# Patient Record
Sex: MALE | Race: ASIAN | URBAN - METROPOLITAN AREA
[De-identification: names, ages, dates, MRNs, and addresses within clinical notes are randomized per-mention and may not be internally consistent; named-entity substitution may affect disease eponyms.]

---

## 2023-02-28 ENCOUNTER — OFFICE VISIT (OUTPATIENT)
Dept: URBAN - METROPOLITAN AREA CLINIC 91 | Facility: CLINIC | Age: 78
End: 2023-02-28
Payer: MEDICARE

## 2023-02-28 DIAGNOSIS — E11.3293 TYPE 2 DIAB WITH MILD NONP RTNOP WITHOUT MACULAR EDEMA, BI: Primary | ICD-10-CM

## 2023-02-28 DIAGNOSIS — H25.13 AGE-RELATED NUCLEAR CATARACT, BILATERAL: ICD-10-CM

## 2023-02-28 DIAGNOSIS — H02.831 DERMATOCHALASIS OF RIGHT UPPER EYELID: ICD-10-CM

## 2023-02-28 DIAGNOSIS — H43.812 VITREOUS DEGENERATION, LEFT EYE: ICD-10-CM

## 2023-02-28 DIAGNOSIS — H02.834 DERMATOCHALASIS OF LEFT UPPER EYELID: ICD-10-CM

## 2023-02-28 PROCEDURE — 92134 CPTRZ OPH DX IMG PST SGM RTA: CPT | Performed by: OPHTHALMOLOGY

## 2023-02-28 PROCEDURE — 99204 OFFICE O/P NEW MOD 45 MIN: CPT | Performed by: OPHTHALMOLOGY

## 2023-02-28 ASSESSMENT — VISUAL ACUITY: OD: 20/40

## 2023-02-28 ASSESSMENT — INTRAOCULAR PRESSURE
OD: 15
OS: 13

## 2023-02-28 NOTE — IMPRESSION/PLAN
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 Plan: I have explained to patient that there is evidence of mild diabetic retinopathy in both eyes. I have stressed the importance of patient maintaining good blood sugar control, and patient understands the need for close follow-up. Patient will return for regular follow-up appointments with repeat dilation. OCTm obtained and reviewed with patient.

## 2023-02-28 NOTE — IMPRESSION/PLAN
Impression: Vitreous degeneration, left eye: H43.181. Plan: For posterior vitreous detachment, I have explained signs and symptoms associated with retinal tear or detachment. Patient was advised to return ASAP if they notice an increase or change in floaters, change or decrease in vision or a veil or curtain coming down over their vision. I have explained the risk of vision loss, and possible need for retinal detachment repair in detail. Patient was also provided with an informational pamphlet.

## 2023-04-05 ENCOUNTER — OFFICE VISIT (OUTPATIENT)
Dept: URBAN - METROPOLITAN AREA CLINIC 91 | Facility: CLINIC | Age: 78
End: 2023-04-05
Payer: MEDICARE

## 2023-04-05 DIAGNOSIS — H43.813 VITREOUS DEGENERATION, BILATERAL: Primary | ICD-10-CM

## 2023-04-05 DIAGNOSIS — H02.834 DERMATOCHALASIS OF LEFT UPPER EYELID: ICD-10-CM

## 2023-04-05 DIAGNOSIS — E11.3293 TYPE 2 DIAB WITH MILD NONP RTNOP WITHOUT MACULAR EDEMA, BI: ICD-10-CM

## 2023-04-05 DIAGNOSIS — H02.831 DERMATOCHALASIS OF RIGHT UPPER EYELID: ICD-10-CM

## 2023-04-05 DIAGNOSIS — H25.813 COMBINED FORMS OF AGE-RELATED CATARACT, BILATERAL: ICD-10-CM

## 2023-04-05 PROCEDURE — 99214 OFFICE O/P EST MOD 30 MIN: CPT | Performed by: OPHTHALMOLOGY

## 2023-04-05 ASSESSMENT — INTRAOCULAR PRESSURE
OS: 16
OD: 20

## 2023-04-05 NOTE — IMPRESSION/PLAN
Impression: Type 2 diab with mild nonp rtnop without macular edema, bi: [id number]. Plan: I have explained to patient that there is evidence of mild diabetic retinopathy in both eyes. I have stressed the importance of patient maintaining good blood sugar control, and patient understands the need for close follow-up. Patient will return for regular follow-up appointments with repeat dilation. OCTm obtained and reviewed with patient.

## 2023-07-06 ENCOUNTER — OFFICE VISIT (OUTPATIENT)
Dept: URBAN - METROPOLITAN AREA CLINIC 91 | Facility: CLINIC | Age: 78
End: 2023-07-06
Payer: MEDICARE

## 2023-07-06 DIAGNOSIS — H25.813 COMBINED FORMS OF AGE-RELATED CATARACT, BILATERAL: Primary | ICD-10-CM

## 2023-07-06 PROCEDURE — 99213 OFFICE O/P EST LOW 20 MIN: CPT | Performed by: OPHTHALMOLOGY

## 2023-07-06 ASSESSMENT — INTRAOCULAR PRESSURE
OD: 16
OS: 14

## 2025-07-29 ENCOUNTER — OFFICE VISIT (OUTPATIENT)
Facility: LOCATION | Age: 80
End: 2025-07-29
Payer: MEDICARE

## 2025-07-29 DIAGNOSIS — H25.813 COMBINED FORMS OF AGE-RELATED CATARACT, BILATERAL: Primary | ICD-10-CM

## 2025-07-29 DIAGNOSIS — H02.834 DERMATOCHALASIS OF LEFT UPPER EYELID: ICD-10-CM

## 2025-07-29 DIAGNOSIS — H02.831 DERMATOCHALASIS OF RIGHT UPPER EYELID: ICD-10-CM

## 2025-07-29 DIAGNOSIS — E11.3293 TYPE 2 DIAB WITH MILD NONP RTNOP WITHOUT MACULAR EDEMA, BI: ICD-10-CM

## 2025-07-29 PROCEDURE — 92134 CPTRZ OPH DX IMG PST SGM RTA: CPT | Performed by: OPHTHALMOLOGY

## 2025-07-29 PROCEDURE — 99214 OFFICE O/P EST MOD 30 MIN: CPT | Performed by: OPHTHALMOLOGY

## 2025-07-29 ASSESSMENT — INTRAOCULAR PRESSURE
OD: 16
OS: 16

## 2025-07-29 ASSESSMENT — VISUAL ACUITY
OD: 20/30
OS: 20/40

## 2025-08-14 ENCOUNTER — OFFICE VISIT (OUTPATIENT)
Facility: LOCATION | Age: 80
End: 2025-08-14
Payer: MEDICARE

## 2025-08-14 DIAGNOSIS — H25.813 COMBINED FORMS OF AGE-RELATED CATARACT, BILATERAL: Primary | ICD-10-CM

## 2025-08-14 PROCEDURE — 99213 OFFICE O/P EST LOW 20 MIN: CPT | Performed by: OPHTHALMOLOGY

## 2025-08-14 PROCEDURE — 92136 OPHTHALMIC BIOMETRY: CPT | Performed by: OPHTHALMOLOGY

## 2025-08-14 RX ORDER — PREDNISOLONE ACETATE 10 MG/ML
1 % SUSPENSION/ DROPS OPHTHALMIC
Qty: 10 | Refills: 2 | Status: ACTIVE
Start: 2025-08-14

## 2025-08-14 RX ORDER — OFLOXACIN 3 MG/ML
0.3 % SOLUTION/ DROPS OPHTHALMIC
Qty: 5 | Refills: 1 | Status: ACTIVE
Start: 2025-08-14

## 2025-08-14 RX ORDER — KETOROLAC TROMETHAMINE 5 MG/ML
0.5 % SOLUTION OPHTHALMIC
Qty: 5 | Refills: 1 | Status: ACTIVE
Start: 2025-08-14

## 2025-08-14 ASSESSMENT — INTRAOCULAR PRESSURE
OD: 17
OS: 17